# Patient Record
Sex: FEMALE | URBAN - METROPOLITAN AREA
[De-identification: names, ages, dates, MRNs, and addresses within clinical notes are randomized per-mention and may not be internally consistent; named-entity substitution may affect disease eponyms.]

---

## 2020-08-12 ENCOUNTER — OUT OF OFFICE VISIT (OUTPATIENT)
Dept: URBAN - METROPOLITAN AREA MEDICAL CENTER 1 | Facility: MEDICAL CENTER | Age: 85
End: 2020-08-12
Payer: COMMERCIAL

## 2020-08-12 DIAGNOSIS — K21.9 ACID REFLUX: ICD-10-CM

## 2020-08-12 DIAGNOSIS — R13.13 PHARYNGEAL DYSPHAGIA: ICD-10-CM

## 2020-08-12 DIAGNOSIS — J44.1 CHRONIC OBSTRUCTIVE PULMONARY DISEASE WITH (ACUTE) EXACERBATION: ICD-10-CM

## 2020-08-12 DIAGNOSIS — I69.391 CVA, OLD, DYSPHAGIA: ICD-10-CM

## 2020-08-12 PROCEDURE — 99222 1ST HOSP IP/OBS MODERATE 55: CPT | Performed by: INTERNAL MEDICINE

## 2020-08-12 PROCEDURE — G8427 DOCREV CUR MEDS BY ELIG CLIN: HCPCS | Performed by: INTERNAL MEDICINE

## 2020-08-12 PROCEDURE — 99232 SBSQ HOSP IP/OBS MODERATE 35: CPT | Performed by: INTERNAL MEDICINE

## 2020-08-26 ENCOUNTER — TELEPHONE ENCOUNTER (OUTPATIENT)
Dept: URBAN - NONMETROPOLITAN AREA CLINIC 2 | Facility: CLINIC | Age: 85
End: 2020-08-26

## 2020-09-03 ENCOUNTER — OFFICE VISIT (OUTPATIENT)
Dept: URBAN - NONMETROPOLITAN AREA CLINIC 13 | Facility: CLINIC | Age: 85
End: 2020-09-03

## 2020-09-10 ENCOUNTER — OFFICE VISIT (OUTPATIENT)
Dept: URBAN - NONMETROPOLITAN AREA CLINIC 13 | Facility: CLINIC | Age: 85
End: 2020-09-10
Payer: COMMERCIAL

## 2020-09-10 ENCOUNTER — LAB OUTSIDE AN ENCOUNTER (OUTPATIENT)
Dept: URBAN - NONMETROPOLITAN AREA CLINIC 13 | Facility: CLINIC | Age: 85
End: 2020-09-10

## 2020-09-10 DIAGNOSIS — R13.10 ESOPHAGEAL DYSPHAGIA: ICD-10-CM

## 2020-09-10 DIAGNOSIS — K21.9 GERD WITHOUT ESOPHAGITIS: ICD-10-CM

## 2020-09-10 PROCEDURE — 99213 OFFICE O/P EST LOW 20 MIN: CPT | Performed by: NURSE PRACTITIONER

## 2020-09-10 PROCEDURE — G8427 DOCREV CUR MEDS BY ELIG CLIN: HCPCS | Performed by: NURSE PRACTITIONER

## 2020-09-10 PROCEDURE — 1036F TOBACCO NON-USER: CPT | Performed by: NURSE PRACTITIONER

## 2020-09-10 PROCEDURE — G8420 CALC BMI NORM PARAMETERS: HCPCS | Performed by: NURSE PRACTITIONER

## 2020-09-10 RX ORDER — ASPIRIN 81 MG/1
1 TABLET TABLET, CHEWABLE ORAL ONCE A DAY
Status: ACTIVE | COMMUNITY

## 2020-09-10 RX ORDER — LORAZEPAM 0.5 MG/1
(SCHEDULE IV DRUG)  (PRIOR AUTH#:000030888346) TABLET ORAL
Qty: 60 DELAYED RELEASE TABLET | Status: ACTIVE | COMMUNITY

## 2020-09-10 RX ORDER — IPRATROPIUM BROMIDE AND ALBUTEROL SULFATE .5; 3 MG/3ML; MG/3ML
(PRIOR AUTH#:000030855744) SOLUTION RESPIRATORY (INHALATION)
Qty: 90 AMP | Status: ON HOLD | COMMUNITY

## 2020-09-10 RX ORDER — OMEPRAZOLE 40 MG/1
1 CAPSULE 30 MINUTES BEFORE MORNING MEAL CAPSULE, DELAYED RELEASE ORAL ONCE A DAY
Status: ON HOLD | COMMUNITY

## 2020-09-10 RX ORDER — MIRTAZAPINE 15 MG/1
1 TABLET AT BEDTIME TABLET, FILM COATED ORAL ONCE A DAY
Status: ACTIVE | COMMUNITY

## 2020-09-10 RX ORDER — PRAVASTATIN SODIUM 80 MG/1
(PRIOR AUTH#:000030884235) TABLET ORAL
Qty: 7 DELAYED RELEASE TABLET | Status: ACTIVE | COMMUNITY

## 2020-09-10 RX ORDER — FAMOTIDINE 20 MG/1
(PRIOR AUTH#:000030884236) TABLET ORAL
Qty: 7 DELAYED RELEASE TABLET | Status: ACTIVE | COMMUNITY

## 2020-09-10 RX ORDER — ALBUTEROL SULFATE 2.5 MG/3ML
(PRIOR AUTH#:000030855743) SOLUTION RESPIRATORY (INHALATION)
Qty: 90 VIAL | Status: ACTIVE | COMMUNITY

## 2020-09-10 RX ORDER — LORAZEPAM 0.5 MG/1
1 TABLET AT BEDTIME AS NEEDED TABLET ORAL ONCE A DAY
Status: ON HOLD | COMMUNITY

## 2020-09-10 RX ORDER — GABAPENTIN 300 MG/1
(PRIOR AUTH#:000030884240) CAPSULE ORAL
Qty: 14 CAP | Status: ACTIVE | COMMUNITY

## 2020-09-10 RX ORDER — LEVOTHYROXINE SODIUM 88 UG/1
(PRIOR AUTH#:000030884233) TABLET ORAL
Qty: 7 DELAYED RELEASE TABLET | Status: ACTIVE | COMMUNITY

## 2020-09-10 RX ORDER — LOSARTAN POTASSIUM 25 MG/1
(PRIOR AUTH#:000030884234) TABLET, FILM COATED ORAL
Qty: 7 DELAYED RELEASE TABLET | Status: ACTIVE | COMMUNITY

## 2020-09-10 RX ORDER — BACLOFEN 10 MG/1
(PRIOR AUTH#:000030884243) TABLET ORAL
Qty: 21 DELAYED RELEASE TABLET | Status: ACTIVE | COMMUNITY

## 2020-09-10 RX ORDER — MULTIVITAMIN/IRON/FOLIC ACID 18MG-0.4MG
1 TABLET AT BEDTIME AS NEEDED TABLET ORAL ONCE A DAY
Status: ACTIVE | COMMUNITY

## 2020-09-10 RX ORDER — MIRTAZAPINE 15 MG/1
(PRIOR AUTH#:000030884238) TABLET, FILM COATED ORAL
Qty: 7 DELAYED RELEASE TABLET | Status: ACTIVE | COMMUNITY

## 2020-09-10 RX ORDER — NYSTATIN 100000 [USP'U]/G
(PRIOR AUTH#:000030881410) CREAM TOPICAL
Qty: 30 G | Status: ACTIVE | COMMUNITY

## 2020-09-10 RX ORDER — OMEPRAZOLE 40 MG/1
(PRIOR AUTH#:000030884237) CAPSULE, DELAYED RELEASE ORAL
Qty: 14 CAP | Status: ACTIVE | COMMUNITY

## 2020-09-10 NOTE — HPI-TODAY'S VISIT:
Mrs. Surekha Montoya is a very pleasant 85-year-old female who presents for hospital follow-up.  She is a nursing home resident.  She was admitted to [facility] in August and we saw her for complaints of dysphagia.  MBS 8/11/2020, during the admission, did reveal mild pharyngeal dysphagia, no aspiration with use of aspiration precautions, there was a single episode of silent aspiration with continuous sips of thin liquids, esophageal dysphagia with large amount of esophageal residue with backflow of the pharynx while in the upright position.  Patient noted to repeatedly clear her throat with these episodes of backflow.  Dr. Cat did see her during the admission and recommended EGD however patient wanted to avoid procedures if possible given her pulmonary status.  She was started on PPI twice daily with plans for esophagram outpatient and consider EGD once her pulmonary status was at baseline. Today, Mrs. Rodriguez has some confusion as she thought she was going to her pulmonologist.  She tells me she had bronchoscopy ordered by Dr. Hector Mendoza and performed by Dr. Surekha Aranda at [place].  Since her bronchoscopy she is requiring oxygen which she did not need prior to the procedure.  She is unsure when her follow-up is. She continues on PPI twice daily.  She denies reflux symptoms.  She does bring up excessive amounts of phlegm throughout the day and at night while sleeping.  She has cough and hoarse voice.  She is frustrated with her decline in pulmonary status.  We had a long discussion that her COPD exacerbations may be related to reflux and aspiration.  She is agreeable to proceed with upper GI but continues to be hesitant for EGD.  Her son is not with her today.  He typically assists in medical decision making with her.  She would like to discuss this with him.  We will get the upper GI and follow-up afterwards.  TG

## 2020-10-05 ENCOUNTER — OFFICE VISIT (OUTPATIENT)
Dept: URBAN - NONMETROPOLITAN AREA CLINIC 2 | Facility: CLINIC | Age: 85
End: 2020-10-05

## 2020-11-05 ENCOUNTER — LAB OUTSIDE AN ENCOUNTER (OUTPATIENT)
Dept: URBAN - NONMETROPOLITAN AREA CLINIC 13 | Facility: CLINIC | Age: 85
End: 2020-11-05

## 2020-11-05 ENCOUNTER — OFFICE VISIT (OUTPATIENT)
Dept: URBAN - NONMETROPOLITAN AREA CLINIC 13 | Facility: CLINIC | Age: 85
End: 2020-11-05
Payer: COMMERCIAL

## 2020-11-05 ENCOUNTER — DASHBOARD ENCOUNTERS (OUTPATIENT)
Age: 85
End: 2020-11-05

## 2020-11-05 DIAGNOSIS — K22.9 MUCOSAL ABNORMALITY OF ESOPHAGUS: ICD-10-CM

## 2020-11-05 DIAGNOSIS — K21.9 GERD WITHOUT ESOPHAGITIS: ICD-10-CM

## 2020-11-05 DIAGNOSIS — R13.10 ESOPHAGEAL DYSPHAGIA: ICD-10-CM

## 2020-11-05 PROCEDURE — G8484 FLU IMMUNIZE NO ADMIN: HCPCS | Performed by: NURSE PRACTITIONER

## 2020-11-05 PROCEDURE — 1036F TOBACCO NON-USER: CPT | Performed by: NURSE PRACTITIONER

## 2020-11-05 PROCEDURE — G8427 DOCREV CUR MEDS BY ELIG CLIN: HCPCS | Performed by: NURSE PRACTITIONER

## 2020-11-05 PROCEDURE — 99213 OFFICE O/P EST LOW 20 MIN: CPT | Performed by: NURSE PRACTITIONER

## 2020-11-05 PROCEDURE — G8420 CALC BMI NORM PARAMETERS: HCPCS | Performed by: NURSE PRACTITIONER

## 2020-11-05 RX ORDER — GABAPENTIN 300 MG/1
(PRIOR AUTH#:000030884240) CAPSULE ORAL
Qty: 14 CAP | Status: ACTIVE | COMMUNITY

## 2020-11-05 RX ORDER — BACLOFEN 10 MG/1
(PRIOR AUTH#:000030884243) TABLET ORAL
Qty: 21 DELAYED RELEASE TABLET | Status: ACTIVE | COMMUNITY

## 2020-11-05 RX ORDER — OMEPRAZOLE 40 MG/1
(PRIOR AUTH#:000030884237) CAPSULE, DELAYED RELEASE ORAL
Qty: 14 CAP | Status: ACTIVE | COMMUNITY

## 2020-11-05 RX ORDER — MIRTAZAPINE 15 MG/1
(PRIOR AUTH#:000030884238) TABLET, FILM COATED ORAL
Qty: 7 DELAYED RELEASE TABLET | Status: ACTIVE | COMMUNITY

## 2020-11-05 RX ORDER — LORAZEPAM 0.5 MG/1
(SCHEDULE IV DRUG)  (PRIOR AUTH#:000030888346) TABLET ORAL
Qty: 60 DELAYED RELEASE TABLET | Status: ACTIVE | COMMUNITY

## 2020-11-05 RX ORDER — PRAVASTATIN SODIUM 80 MG/1
(PRIOR AUTH#:000030884235) TABLET ORAL
Qty: 7 DELAYED RELEASE TABLET | Status: ACTIVE | COMMUNITY

## 2020-11-05 RX ORDER — MULTIVITAMIN/IRON/FOLIC ACID 18MG-0.4MG
1 TABLET AT BEDTIME AS NEEDED TABLET ORAL ONCE A DAY
Status: ACTIVE | COMMUNITY

## 2020-11-05 RX ORDER — ALBUTEROL SULFATE 2.5 MG/3ML
(PRIOR AUTH#:000030855743) SOLUTION RESPIRATORY (INHALATION)
Qty: 90 VIAL | Status: ACTIVE | COMMUNITY

## 2020-11-05 RX ORDER — ASPIRIN 81 MG/1
1 TABLET TABLET, CHEWABLE ORAL ONCE A DAY
Status: ACTIVE | COMMUNITY

## 2020-11-05 RX ORDER — MIRTAZAPINE 15 MG/1
1 TABLET AT BEDTIME TABLET, FILM COATED ORAL ONCE A DAY
Status: ACTIVE | COMMUNITY

## 2020-11-05 RX ORDER — NYSTATIN 100000 [USP'U]/G
(PRIOR AUTH#:000030881410) CREAM TOPICAL
Qty: 30 G | Status: ACTIVE | COMMUNITY

## 2020-11-05 RX ORDER — LOSARTAN POTASSIUM 25 MG/1
(PRIOR AUTH#:000030884234) TABLET, FILM COATED ORAL
Qty: 7 DELAYED RELEASE TABLET | Status: ACTIVE | COMMUNITY

## 2020-11-05 RX ORDER — LEVOTHYROXINE SODIUM 88 UG/1
(PRIOR AUTH#:000030884233) TABLET ORAL
Qty: 7 DELAYED RELEASE TABLET | Status: ACTIVE | COMMUNITY

## 2020-11-05 RX ORDER — FAMOTIDINE 20 MG/1
(PRIOR AUTH#:000030884236) TABLET ORAL
Qty: 7 DELAYED RELEASE TABLET | Status: ACTIVE | COMMUNITY

## 2020-11-05 NOTE — HPI-TODAY'S VISIT:
9/10/20 Mrs. Surekha Montoya is a very pleasant 85-year-old female who presents for hospital follow-up.  She is a nursing home resident.  She was admitted to [facility] in August and we saw her for complaints of dysphagia.  MBS 8/11/2020, during the admission, did reveal mild pharyngeal dysphagia, no aspiration with use of aspiration precautions, there was a single episode of silent aspiration with continuous sips of thin liquids, esophageal dysphagia with large amount of esophageal residue with backflow of the pharynx while in the upright position.  Patient noted to repeatedly clear her throat with these episodes of backflow.  Dr. Cat did see her during the admission and recommended EGD however patient wanted to avoid procedures if possible given her pulmonary status.  She was started on PPI twice daily with plans for esophagram outpatient and consider EGD once her pulmonary status was at baseline. Today, Mrs. Montoya has some confusion as she thought she was going to her pulmonologist.  She tells me she had bronchoscopy ordered by Dr. Hector Mendoza and performed by Dr. Surekha Aranda at [place].  Since her bronchoscopy she is requiring oxygen which she did not need prior to the procedure.  She is unsure when her follow-up is. She continues on PPI twice daily.  She denies reflux symptoms.  She does bring up excessive amounts of phlegm throughout the day and at night while sleeping.  She has cough and hoarse voice.  She is frustrated with her decline in pulmonary status.  We had a long discussion that her COPD exacerbations may be related to reflux and aspiration.  She is agreeable to proceed with upper GI but continues to be hesitant for EGD.  Her son is not with her today.  He typically assists in medical decision making with her.  She would like to discuss this with him.  We will get the upper GI and follow-up afterwards.  TG  11/5/20 Patient presents for follow up after UGI series 9/29/20 that showed mucosal irregularity of the esophagus at the level of the arch of the aorta with no irregularlities of the distal esophagus concerning for neoplastic disease or caustic/infllamtory changes; reflux esophageal disease less likely given obasent changes of more distal esophagus. She has been on PPI BID with no significant improvement in her hoarse voice or cough. She denies dysphagia but tells me she can feel the food going through slowly. She is careful to chew well and eats soft foods. She does not bring food back up. No pain with swallowing. No nausea/vomiting. Her main frustration today is that she remains on O2. She has not followed up with pulmonary. TG

## 2020-12-07 ENCOUNTER — OFFICE VISIT (OUTPATIENT)
Dept: URBAN - NONMETROPOLITAN AREA CLINIC 2 | Facility: CLINIC | Age: 85
End: 2020-12-07